# Patient Record
Sex: FEMALE | Race: WHITE
[De-identification: names, ages, dates, MRNs, and addresses within clinical notes are randomized per-mention and may not be internally consistent; named-entity substitution may affect disease eponyms.]

---

## 2019-09-09 ENCOUNTER — HOSPITAL ENCOUNTER (INPATIENT)
Dept: HOSPITAL 92 - L&D | Age: 34
LOS: 2 days | Discharge: HOME | End: 2019-09-11
Attending: OBSTETRICS & GYNECOLOGY | Admitting: OBSTETRICS & GYNECOLOGY
Payer: COMMERCIAL

## 2019-09-09 VITALS — BODY MASS INDEX: 32.7 KG/M2

## 2019-09-09 DIAGNOSIS — Z3A.39: ICD-10-CM

## 2019-09-09 DIAGNOSIS — O34.211: Primary | ICD-10-CM

## 2019-09-09 LAB
HBSAG INDEX: 0.17 S/CO (ref 0–0.99)
HGB BLD-MCNC: 12.1 G/DL (ref 12–16)
MCH RBC QN AUTO: 31.4 PG (ref 27–31)
MCV RBC AUTO: 90.1 FL (ref 78–98)
PLATELET # BLD AUTO: 263 THOU/UL (ref 130–400)
RBC # BLD AUTO: 3.84 MILL/UL (ref 4.2–5.4)
SYPHILIS ANTIBODY INDEX: 0.04 S/CO
WBC # BLD AUTO: 9.5 THOU/UL (ref 4.8–10.8)

## 2019-09-09 PROCEDURE — 86901 BLOOD TYPING SEROLOGIC RH(D): CPT

## 2019-09-09 PROCEDURE — 36415 COLL VENOUS BLD VENIPUNCTURE: CPT

## 2019-09-09 PROCEDURE — 86900 BLOOD TYPING SEROLOGIC ABO: CPT

## 2019-09-09 PROCEDURE — 85027 COMPLETE CBC AUTOMATED: CPT

## 2019-09-09 PROCEDURE — 87340 HEPATITIS B SURFACE AG IA: CPT

## 2019-09-09 PROCEDURE — 86850 RBC ANTIBODY SCREEN: CPT

## 2019-09-09 PROCEDURE — 86780 TREPONEMA PALLIDUM: CPT

## 2019-09-09 PROCEDURE — 51702 INSERT TEMP BLADDER CATH: CPT

## 2019-09-09 RX ADMIN — DOCUSATE CALCIUM SCH: 240 CAPSULE, LIQUID FILLED ORAL at 23:30

## 2019-09-09 NOTE — OP
DATE OF PROCEDURE:  2019



PREOPERATIVE DIAGNOSES:  

1. Planned repeat  section at 39 weeks.

2. Declines trial of labor.



POSTOPERATIVE DIAGNOSES:  

1. Planned repeat  section at 39 weeks.

2. Declines trial of labor.



PROCEDURE PERFORMED:  Repeat low-transverse  section.



ASSISTANT:  Crys Mijares PA-C



COMPLICATIONS:  None.



ESTIMATED BLOOD LOSS:  600 mL.



QBL:  Pending.



OPERATIVE FINDINGS:  

1. Vigorous male infant, Apgars and weight pending at the time of this dictation.

2. Low transverse hysterotomy without extension.

3. Normal-appearing uterus, tubes, and ovaries bilaterally.

4. Normal-appearing placenta.

5. Copious amounts of blood-tinged amniotic fluid noted at time of rupture of

membranes. 

6. Fundus firm after delivery of the placenta.



DESCRIPTION OF PROCEDURE:  The patient was taken back to the OR with IV fluids

running.  When she was in the OR, spinal anesthesia was obtained.  The patient was

then placed in dorsal supine position with a left lateral tilt.  Booth catheter was

placed using sterile technique.  2 g of Ancef was administered.  The abdomen was

prepped and draped in normal fashion for  section.  Surgeons were gowned and

gloved.  Anesthesia was tested and found to be adequate.  A Pfannenstiel skin

incision was made with a scalpel.  Skin incision was carried down through the

subcutaneous tissue to the fascia.  Once the fascia was reached, it was incised in

the midline and extended superolaterally using curved Meier scissors.  Kocher clamps

were placed at the superior border of the fascia, which was sharply and bluntly

dissected off the rectus abdominis muscles in both caudad and cephalad direction

allowing adequate space for delivery of the infant.  The rectus muscles and

peritoneum were bluntly entered in the midline and stretched laterally.  An Cornelio O

retractor was placed into the peritoneal cavity for retraction, visualization, and

protection of the wound.  A bladder flap was created using the scalpel, and the

bladder reflection was dissected away from the planned hysterotomy site.  A

low-transverse hysterotomy was made with a scalpel.  The hysterotomy was bluntly

entered and stretched using a Anderson maneuver.  An Allis clamp was used to create an

amniotomy with blood-tinged fluid noted.  The infant was then delivered vertex

through the incision without difficulty.  Once the infant was delivered as noted to

be vigorous, the cord was doubly clamped and cut, and the infant was handed off to

special care nurse in attendance.  Cord blood was collected.  The placenta was

delivered.  The uterus was exteriorized, massaged to firm and cleared of clot and

debris.  Hysterotomy was inspected, and no areas of extension were noted.

Hysterotomy was then closed using Monocryl suture in a running locked fashion.

After the hysterotomy was closed, it was inspected and no areas of bleeding were

noted.  The hysterotomy and paracolic gutters were irrigated and suctioned dry.

Hysterotomy was inspected again with no bleeding noted from the hysterotomy.  The

Cornelio O retractor was removed from the abdominal cavity.  The rectus muscle and

fascia were inspected, and any small areas of bleeding were controlled with Bovie

cauterization.  The rectus fascia was reapproximated with PDS suture from corner to

corner.  The subcutaneous tissue was then irrigated, and any small areas of bleeding

were controlled with Bovie cauterization.  Subcutaneous tissue was reapproximated

with plain gut suture.  The skin was closed with 4-0 Monocryl and dressed with

Dermabond dressing.  The patient tolerated the procedure well.  The lap and

instrument count were correct x2.  There were no complications. 







Job ID:  [id number]

## 2019-09-09 NOTE — PDOC.LDHP
Labor and Delivery H&P


Chief complaint: scheduled  section


HPI: 





Pt is here for scheduled RCS, declines TOLAC. 


Current gestational age (weeks): 39


Due date: 19


Grav: 2


Para: 1


OB History Details: 





hx of CS w abnormal blood loss, had a blood transfusion


Current pregnancy complications: none


Abnormal US findings: No


Current medications: pre- vitamins


Previous surgical history: low tranverse CS


Social history: none





- Physical Exam


Vital signs reviewed and normal: yes


General: NAD


Heart: RRR


Lungs: CTAB


Abdomen: gravid


Extremeties: no edema


FHT: category 1





- OB Labs


Blood type: O


RH: positive


Antibody Screen: negative


HIV: negative


RPR: negative


HEPSAg: negative


1 hour GCT: negative


Urine drug screen: negative


Rubella: immune





- Assessment


L&D Assessment: scheduled repeat  section





- Plan


Plan: admit to L&D, informed consent obtained, anesthesia consult for pain 

management


-: 





A?P:  @ 39 weeks, here for scheduled RCS, hx of PPH last delivery, will 

have uterotonics readily available.

## 2019-09-10 LAB
HGB BLD-MCNC: 9.6 G/DL (ref 12–16)
MCH RBC QN AUTO: 31.8 PG (ref 27–31)
MCV RBC AUTO: 92.1 FL (ref 78–98)
PLATELET # BLD AUTO: 186 THOU/UL (ref 130–400)
RBC # BLD AUTO: 3.01 MILL/UL (ref 4.2–5.4)
WBC # BLD AUTO: 7.4 THOU/UL (ref 4.8–10.8)

## 2019-09-10 RX ADMIN — DOCUSATE CALCIUM SCH MG: 240 CAPSULE, LIQUID FILLED ORAL at 08:35

## 2019-09-10 RX ADMIN — DOCUSATE CALCIUM SCH MG: 240 CAPSULE, LIQUID FILLED ORAL at 22:00

## 2019-09-10 NOTE — PDOC.PP
Post Partum Progress Note


Post Partum Day #: 1


Subjective: 





doing well, minimal pain, breast and bottle feeding


PO intake tolerated: yes


Flatus: yes


Ambulation: yes


 Vital Signs (12 hours)











  Temp Pulse Resp BP Pulse Ox


 


 09/10/19 08:05  98.4 F  59 L  20  107/52 L  98


 


 09/10/19 04:50  98.7 F  80  16  98/56 L 


 


 09/10/19 01:10  98.0 F  60  16  90/46 L 








 Weight











Weight                         162 lb

















- Physical Examination


General: NAD


Respiratory: non-labored breathing


Abdominal: + bowel sounds, no distention


Fundus firm & at: below umb


Skin: CS incision dry & intact, no rash


Neurological: no gross focal deficits


Psychiatric: A&Ox3, normal affect


Result Diagrams: 


 09/10/19 06:17





Additional Labs: 


 Post Partum Labs











Blood Type  O POSITIVE   19  12:20    


 


Hep Bs Antigen  Non-Reactive S/CO (NonReactive)   19  12:20    











(1)  delivery delivered


Code(s): O82 - ENCOUNTER FOR  DELIVERY WITHOUT INDICATION   Status: 

Acute   





- Assessment/Plan





POD1 doing well, possible DC tomorrow.

## 2019-09-11 VITALS — TEMPERATURE: 98.6 F

## 2019-09-11 VITALS — SYSTOLIC BLOOD PRESSURE: 107 MMHG | DIASTOLIC BLOOD PRESSURE: 60 MMHG

## 2019-09-11 RX ADMIN — DOCUSATE CALCIUM SCH MG: 240 CAPSULE, LIQUID FILLED ORAL at 09:35

## 2019-09-11 NOTE — PDOC.PP
Post Partum Progress Note


Post Partum Day #: 2


Subjective: 





minimal pain, breast and bottle feeding, min lochia, no concerns


PO intake tolerated: yes


Flatus: yes


Ambulation: yes


 Vital Signs (12 hours)











  Temp Pulse Resp BP Pulse Ox


 


 19 08:10  98.6 F  72  20  102/58 L  97


 


 19 05:24  98.0 F  63  16  114/54 L 


 


 19 00:44  98.4 F  61  16  121/59 L 








 Weight











Weight                         162 lb

















- Physical Examination


General: NAD


Respiratory: non-labored breathing


Abdominal: no distention


Skin: CS incision dry & intact


Neurological: no gross focal deficits


Psychiatric: A&Ox3, normal affect


Result Diagrams: 


 09/10/19 06:17





Additional Labs: 


 Post Partum Labs











Blood Type  O POSITIVE   19  12:20    


 


Hep Bs Antigen  Non-Reactive S/CO (NonReactive)   19  12:20    











(1)  delivery delivered


Code(s): O82 - ENCOUNTER FOR  DELIVERY WITHOUT INDICATION   Status: 

Acute   





- Assessment/Plan





POD2, doing well, plan for DC today at pt request.  OTC iron reviewed and pain 

medication use reviewed.